# Patient Record
Sex: FEMALE | Race: WHITE | Employment: FULL TIME | URBAN - METROPOLITAN AREA
[De-identification: names, ages, dates, MRNs, and addresses within clinical notes are randomized per-mention and may not be internally consistent; named-entity substitution may affect disease eponyms.]

---

## 2019-08-15 ENCOUNTER — HOSPITAL ENCOUNTER (OUTPATIENT)
Age: 34
Discharge: HOME | End: 2019-08-15
Attending: EMERGENCY MEDICINE
Payer: COMMERCIAL

## 2019-08-15 ENCOUNTER — APPOINTMENT (OUTPATIENT)
Dept: GENERAL RADIOLOGY | Age: 34
End: 2019-08-15
Attending: EMERGENCY MEDICINE
Payer: COMMERCIAL

## 2019-08-15 VITALS
OXYGEN SATURATION: 98 % | SYSTOLIC BLOOD PRESSURE: 121 MMHG | HEART RATE: 69 BPM | BODY MASS INDEX: 19.29 KG/M2 | HEIGHT: 66 IN | DIASTOLIC BLOOD PRESSURE: 74 MMHG | TEMPERATURE: 97 F | WEIGHT: 120 LBS

## 2019-08-15 DIAGNOSIS — S93.401A MODERATE RIGHT ANKLE SPRAIN, INITIAL ENCOUNTER: Primary | ICD-10-CM

## 2019-08-15 PROCEDURE — 99203 OFFICE O/P NEW LOW 30 MIN: CPT

## 2019-08-15 PROCEDURE — 73610 X-RAY EXAM OF ANKLE: CPT | Performed by: EMERGENCY MEDICINE

## 2019-08-15 PROCEDURE — 73630 X-RAY EXAM OF FOOT: CPT | Performed by: EMERGENCY MEDICINE

## 2019-08-15 PROCEDURE — 99204 OFFICE O/P NEW MOD 45 MIN: CPT

## 2019-08-15 RX ORDER — ACETAMINOPHEN 325 MG/1
650 TABLET ORAL ONCE
Status: COMPLETED | OUTPATIENT
Start: 2019-08-15 | End: 2019-08-15

## 2019-08-15 RX ORDER — NAPROXEN 500 MG/1
500 TABLET ORAL 2 TIMES DAILY WITH MEALS
Qty: 20 TABLET | Refills: 0 | Status: SHIPPED | OUTPATIENT
Start: 2019-08-15 | End: 2019-08-22

## 2019-08-15 RX ORDER — METHOCARBAMOL 500 MG/1
500 TABLET, FILM COATED ORAL ONCE AS NEEDED
Qty: 10 TABLET | Refills: 0 | Status: SHIPPED | OUTPATIENT
Start: 2019-08-15 | End: 2019-08-15

## 2019-08-15 RX ORDER — IBUPROFEN 400 MG/1
400 TABLET ORAL EVERY 6 HOURS PRN
COMMUNITY

## 2019-08-15 NOTE — ED PROVIDER NOTES
Patient Seen in: [facility] In [address] [place] [address]    History   Patient presents with:  Lower Extremity Injury (musculoskeletal)    Stated Complaint: TL-ankle injury    HPI    HPI: Gera Morris is a 34year old female who presents after a from today and agreed except as otherwise stated in HPI.     Physical Exam     ED Triage Vitals [08/15/19 1306]   /74   Pulse 69   Resp    Temp 97.3 °F (36.3 °C)   Temp src Oral   SpO2 98 %   O2 Device None (Room air)       Current:/74   Pulse 6 diagnosis)    Disposition:  Discharge    Follow-up:  Satish Polk MD  [facility]  [facility] [address]  [phone]    Schedule an appointment as soon as possible for a visit in 3 days  If symptoms worsen      Medications Prescri

## 2019-08-15 NOTE — ED INITIAL ASSESSMENT (HPI)
Pt to IC with pain in right foot and ankle after twisting it while wearing high heeled shoes 4 days ago.

## 2023-06-06 ENCOUNTER — HOSPITAL ENCOUNTER (OUTPATIENT)
Age: 38
Discharge: HOME | End: 2023-06-06
Payer: COMMERCIAL

## 2023-06-06 VITALS
OXYGEN SATURATION: 99 % | HEART RATE: 87 BPM | SYSTOLIC BLOOD PRESSURE: 117 MMHG | RESPIRATION RATE: 16 BRPM | DIASTOLIC BLOOD PRESSURE: 72 MMHG | TEMPERATURE: 98 F

## 2023-06-06 DIAGNOSIS — H66.001 ACUTE SUPPURATIVE OTITIS MEDIA OF RIGHT EAR WITHOUT SPONTANEOUS RUPTURE OF TYMPANIC MEMBRANE, RECURRENCE NOT SPECIFIED: Primary | ICD-10-CM

## 2023-06-06 PROCEDURE — 99203 OFFICE O/P NEW LOW 30 MIN: CPT | Performed by: PHYSICIAN ASSISTANT

## 2023-06-06 RX ORDER — AMOXICILLIN 500 MG/1
500 TABLET, FILM COATED ORAL 3 TIMES DAILY
Qty: 30 TABLET | Refills: 0 | Status: SHIPPED | OUTPATIENT
Start: 2023-06-06 | End: 2023-06-16

## 2023-10-01 ENCOUNTER — HOSPITAL ENCOUNTER (OUTPATIENT)
Age: 38
Discharge: HOME | End: 2023-10-01
Payer: COMMERCIAL

## 2023-10-01 ENCOUNTER — APPOINTMENT (OUTPATIENT)
Dept: GENERAL RADIOLOGY | Age: 38
End: 2023-10-01
Attending: PHYSICIAN ASSISTANT
Payer: COMMERCIAL

## 2023-10-01 VITALS
TEMPERATURE: 98 F | HEART RATE: 75 BPM | RESPIRATION RATE: 22 BRPM | OXYGEN SATURATION: 99 % | SYSTOLIC BLOOD PRESSURE: 111 MMHG | DIASTOLIC BLOOD PRESSURE: 81 MMHG

## 2023-10-01 DIAGNOSIS — M79.672 LEFT FOOT PAIN: Primary | ICD-10-CM

## 2023-10-01 PROCEDURE — 73630 X-RAY EXAM OF FOOT: CPT | Performed by: PHYSICIAN ASSISTANT

## 2023-10-01 PROCEDURE — 99213 OFFICE O/P EST LOW 20 MIN: CPT | Performed by: PHYSICIAN ASSISTANT

## 2023-10-01 PROCEDURE — A6448 LT COMPRES BAND <3"/YD: HCPCS | Performed by: PHYSICIAN ASSISTANT

## 2023-10-01 RX ORDER — IBUPROFEN 600 MG/1
600 TABLET ORAL ONCE
Status: COMPLETED | OUTPATIENT
Start: 2023-10-01 | End: 2023-10-01

## 2023-10-01 NOTE — ED INITIAL ASSESSMENT (HPI)
Pt with L heel pain after missing a step this morning and landing wrong on her heel. Pt rated pain 9/10.

## 2023-10-03 ENCOUNTER — OFFICE VISIT (OUTPATIENT)
Dept: PODIATRY CLINIC | Facility: CLINIC | Age: 38
End: 2023-10-03

## 2023-10-03 DIAGNOSIS — M79.672 PAIN OF LEFT HEEL: ICD-10-CM

## 2023-10-03 DIAGNOSIS — S99.922A INJURY OF LEFT HEEL, INITIAL ENCOUNTER: Primary | ICD-10-CM

## 2023-10-03 PROCEDURE — L4386 NON-PNEUM WALK BOOT PRE CST: HCPCS | Performed by: PODIATRIST

## 2023-10-03 PROCEDURE — 99203 OFFICE O/P NEW LOW 30 MIN: CPT | Performed by: PODIATRIST

## 2023-10-03 RX ORDER — HYDROCODONE BITARTRATE AND ACETAMINOPHEN 5; 325 MG/1; MG/1
1 TABLET ORAL NIGHTLY PRN
Qty: 7 TABLET | Refills: 0 | Status: SHIPPED | OUTPATIENT
Start: 2023-10-03 | End: 2023-10-10

## 2023-10-03 NOTE — PROGRESS NOTES
[facility]  Progress Note    Dimas Last is a 30year old female. Patient presents with: Injury: Left heel - onset 3 days ago when she missed a step and she felt she shattered her heel - was in [facility] Care in [facility] and has x-rays in the system - she is NWB with crutches - she states she has pain rated as 7-9/10 at all the time -         HPI:     This is a pleasant female that presents to clinic today due to left heel injury. She states on 10/1/23 she missed a step and hit her left heel against the concrete. She went to  and did get xrays which were negative for fracture. She has been NWB with crutches. She still complains of severe pain and difficulty sleeping. Allergies: Patient has no known allergies. Current Outpatient Medications   Medication Sig Dispense Refill    ibuprofen 400 MG Oral Tab Take 400 mg by mouth every 6 (six) hours as needed for Pain. History reviewed. No pertinent past medical history. Past Surgical History:   Procedure Laterality Date    OTHER SURGICAL HISTORY  2/10/15  [facility] [place])    EGD (vomiting, wt loss): gastritis, gastroparesis      Family History   Problem Relation Age of Onset    Other (Drug dependence [Other]) Father     Other (Drug dependence [Other]) Mother       Social History    Socioeconomic History      Marital status:     Tobacco Use      Smoking status: Some Days      Smokeless tobacco: Never      Tobacco comments: Hookah once or twice a week    Vaping Use      Vaping Use: Never used    Substance and Sexual Activity      Alcohol use: No      Drug use: Yes        Comment: THC in the past, and only if has severe nausea/vomiting          REVIEW OF SYSTEMS:   Denies nausea, fever, chills  No calf pain  No other muscle or joint aches  Denies chest pain or shortness of breath. EXAM:   [facility] 06/04/2023     Constitution: Well-developed and well-nourished. Gait appears normal. No apparent distress.  Alert and oriented to person, place, and time. Integument: There are no varicosities. Skin appears moist, warm, and supple with positive hair growth. Mild ecchymotic changes to left posterior heel    Vascular examination: Dorsalis pedis and posterior tibial pulses are strong bilaterally with capillary filling time less than 3 seconds to all digits. There is no peripheral edema. Neurological Sensorium: Grossly intact to sharp/dull. Vibratory: Intact. Musculoskeletal:   Left pedal muscle strength severely diminished due to pain and guarding  Severe POP diffusely to left heel    Left Achilles tendon appears well intact with no signs of rupture        LABS & IMAGING:   No results found for: \"GLU\", \"BUN\", \"CREATSERUM\", \"BUNCREA\", \"ANIONGAP\", \"GFRAA\", \"GFRNAA\", \"CA\", \"NA\", \"K\", \"CL\", \"CO2\", \"OSMOCALC\"     No results found for: \"EAG\", \"A1C\"     No results found. ASSESSMENT AND PLAN:   Diagnoses and all orders for this visit:    Injury of left heel, initial encounter    Pain of left heel        Plan:     Evaluated the patient and discussed treatment options. Reviewed the patients x-rays with the patient. Discussed the importance of immobilization. Discussed conservative management   Recommend cryotherapy/icing, rest, and elevation. Xray Left foot: 10/1/23  CONCLUSION: No acute fracture or dislocation. Explained to pt that she most likely caused a heel contusion due to the injury. Fitted and dispensed short cam boot left LE. Pt to be NWB left LE with short cam boot and crutches x 2 week, if improved after 2 weeks she can transition to WBAT in the cam boot. Prescribed norco 5 PRN nightly due to pain    RTC 4 weeks, if improved will transition to tennis shoes with gel heel cups. If no improvement will get MRI. No follow-ups on file.     Chemo Doran DPM  10/3/2023

## 2023-10-04 ENCOUNTER — TELEPHONE (OUTPATIENT)
Dept: PODIATRY CLINIC | Facility: CLINIC | Age: 38
End: 2023-10-04

## 2023-10-04 NOTE — TELEPHONE ENCOUNTER
S/w pt and she states the camboot is too large for her and it slides on and off, not secure. She states she wears 7.5 shoe size and the boot is a medium. S/w [facility] office and no small sizes available. S/w pt and asked if she could come to [facility] location today and I will see if another boot size would be better. She will come today at 530pm at [facility]. Dr Jazlyn Burgos aware.

## 2023-10-04 NOTE — TELEPHONE ENCOUNTER
Pt came to [facility] office and was fit with a small short camboot. It fit well and pt expressed feeling much more comfortable. Pt requesting a letter for work that states she can work from home for the next two wks and then after that can return to office.  Please advise on letter and restrictions

## 2023-10-04 NOTE — TELEPHONE ENCOUNTER
Per pt (with Linda Savage  on the line) states she did not get any paperwork when she left the office yesterday and states the boot was too big.  Please advise

## 2023-10-09 ENCOUNTER — TELEPHONE (OUTPATIENT)
Dept: PODIATRY CLINIC | Facility: CLINIC | Age: 38
End: 2023-10-09

## 2023-10-09 NOTE — TELEPHONE ENCOUNTER
Pt calling statting that she has ADA forms that need to be filled out. Pt missed work due to foot injury and provider wrote her a letter in the past but now she needs form filled out. Pt given forms dept email address.     Type of Leave: ADA   Reason for Leave: injury of left heel   Start date of leave: 10/1/23  How much time needed?: RTW (work from home ) 10/20/23  Forms Due Date: 10/19/23, will ask for an extension   Was Fee and Turnaround info Given?: yes

## 2023-11-28 NOTE — TELEPHONE ENCOUNTER
Spoke with patient to confirm her work schedule. Will send note through [email or web address]. Dentures removed/missing teeth

## (undated) NOTE — LETTER
10/5/2023          To Whom It May Concern:    Hiren Messer is currently under my medical care and needs the accommodation of working from home through Friday, October 20, 2023. She may return to work at the office on Monday, October 23, 2023. If you require additional information please contact our office.         Sincerely,    Tegan Steele DPM

## (undated) NOTE — LETTER
Date & Time: 10/1/2023, 9:04 AM  Patient: Jett Lora  Encounter Provider(s):    Jenn Jacome PA-C       To Whom It May Concern:    Renetta Moreno was seen and treated in our department on 10/1/2023. She can return to work 10/9/2023.     If you have any questions or concerns, please do not hesitate to call.        _____________________________  Physician/APC Signature

## (undated) NOTE — LETTER
Date & Time: 8/15/2019, 1:46 PM  Patient: Junie Dorado  Encounter Provider(s):    Shaye Moyer MD       To Whom It May Concern:    Thyra Favre was seen and treated in our department on 8/15/2019. Please excuse Artem Woodard from work today.  Maurliio Prophet

## (undated) NOTE — LETTER
Date & Time: 6/6/2023, 10:26 AM  Patient: Lindsey Rock  Encounter Provider(s):    Hugo Sanchez PA-C       To Whom It May Concern:    Spencer Ortiz was seen and treated in our department on 6/6/2023. She can return to work 6/7/2023.     If you have any questions or concerns, please do not hesitate to call.        _____________________________  Physician/APC Signature